# Patient Record
Sex: MALE | Race: WHITE | NOT HISPANIC OR LATINO
[De-identification: names, ages, dates, MRNs, and addresses within clinical notes are randomized per-mention and may not be internally consistent; named-entity substitution may affect disease eponyms.]

---

## 2024-10-10 ENCOUNTER — DASHBOARD ENCOUNTERS (OUTPATIENT)
Age: 59
End: 2024-10-10

## 2024-10-10 ENCOUNTER — LAB OUTSIDE AN ENCOUNTER (OUTPATIENT)
Dept: URBAN - METROPOLITAN AREA CLINIC 40 | Facility: CLINIC | Age: 59
End: 2024-10-10

## 2024-10-10 ENCOUNTER — OFFICE VISIT (OUTPATIENT)
Dept: URBAN - METROPOLITAN AREA CLINIC 40 | Facility: CLINIC | Age: 59
End: 2024-10-10
Payer: COMMERCIAL

## 2024-10-10 VITALS
TEMPERATURE: 98.1 F | SYSTOLIC BLOOD PRESSURE: 150 MMHG | DIASTOLIC BLOOD PRESSURE: 80 MMHG | HEART RATE: 84 BPM | WEIGHT: 174 LBS | HEIGHT: 71 IN | BODY MASS INDEX: 24.36 KG/M2

## 2024-10-10 DIAGNOSIS — Z09 ENCOUNTER FOR FOLLOW-UP: ICD-10-CM

## 2024-10-10 DIAGNOSIS — Z87.19 HISTORY OF PANCREATITIS: ICD-10-CM

## 2024-10-10 DIAGNOSIS — R19.7 ACUTE DIARRHEA: ICD-10-CM

## 2024-10-10 DIAGNOSIS — R10.11 RUQ ABDOMINAL PAIN: ICD-10-CM

## 2024-10-10 DIAGNOSIS — Z86.0100 PERSONAL HISTORY OF COLONIC POLYPS: ICD-10-CM

## 2024-10-10 PROCEDURE — 99203 OFFICE O/P NEW LOW 30 MIN: CPT | Performed by: PHYSICIAN ASSISTANT

## 2024-10-10 RX ORDER — DILTIAZEM HYDROCHLORIDE 120 MG/1
AS DIRECTED TABLET, FILM COATED ORAL
Status: ACTIVE | COMMUNITY

## 2024-10-10 RX ORDER — ROSUVASTATIN CALCIUM 5 MG/1
1 TABLET TABLET, COATED ORAL ONCE A DAY
Status: ACTIVE | COMMUNITY

## 2024-10-10 RX ORDER — LEVOTHYROXINE SODIUM 137 UG/1
1 TABLET IN THE MORNING ON AN EMPTY STOMACH TABLET ORAL ONCE A DAY
Status: ACTIVE | COMMUNITY

## 2024-10-10 RX ORDER — ESOMEPRAZOLE MAGNESIUM 20 MG/1
1 CAPSULE CAPSULE, DELAYED RELEASE PELLETS ORAL ONCE A DAY
Status: ACTIVE | COMMUNITY

## 2024-10-10 RX ORDER — SITAGLIPTIN 25 MG/1
AS DIRECTED TABLET, FILM COATED ORAL
Status: ACTIVE | COMMUNITY

## 2024-10-10 RX ORDER — LISINOPRIL 20 MG/1
1 TABLET TABLET ORAL ONCE A DAY
Status: ACTIVE | COMMUNITY

## 2024-10-10 NOTE — HPI-TODAY'S VISIT:
Mr. Sue is a 59-year-old white male who presents to the office today with recent onset of loose stools with right upper quadrant abdominal pain after eating a hamburger and French fries at [employer] in the last 1 week.  No nausea, vomiting, fever or chills reported.  Patient has a history of hypertension, thyroid disease, history of "hereditary pancreatitis" with several generations study by providers of [facility] greater than 20 years ago.  Patient has had no episode of pancreatitis in at least 20 years.  History of colon polyps, previously followed by Dr. Rico.  He is overdue for surveillance colonoscopy with benign polyps removed approximately 10 years ago.  He has some history of heartburn and takes esomeprazole low-dose 20 mg daily.  Denies alcohol use and has been chewing tobacco for many years.  No recent EGD.  Denies IV or recreational drug use.  History of A-fib reportedly.  Patient had labs drawn last week including amylase and lipase by primary care provider and states that the results were normal.

## 2024-10-23 ENCOUNTER — OFFICE VISIT (OUTPATIENT)
Dept: URBAN - METROPOLITAN AREA CLINIC 39 | Facility: CLINIC | Age: 59
End: 2024-10-23
Payer: COMMERCIAL

## 2024-10-23 DIAGNOSIS — R93.2 ABNORMAL ULTRASOUND OF LIVER: ICD-10-CM

## 2024-10-23 DIAGNOSIS — N28.1 RENAL CYST: ICD-10-CM

## 2024-10-23 PROCEDURE — 76705 ECHO EXAM OF ABDOMEN: CPT | Performed by: INTERNAL MEDICINE

## 2024-11-18 ENCOUNTER — OFFICE VISIT (OUTPATIENT)
Dept: URBAN - METROPOLITAN AREA SURGERY CENTER 30 | Facility: SURGERY CENTER | Age: 59
End: 2024-11-18

## 2024-12-03 ENCOUNTER — OFFICE VISIT (OUTPATIENT)
Dept: URBAN - METROPOLITAN AREA SURGERY CENTER 30 | Facility: SURGERY CENTER | Age: 59
End: 2024-12-03

## 2024-12-13 ENCOUNTER — OFFICE VISIT (OUTPATIENT)
Dept: URBAN - METROPOLITAN AREA TELEHEALTH 2 | Facility: TELEHEALTH | Age: 59
End: 2024-12-13

## 2024-12-13 RX ORDER — DILTIAZEM HYDROCHLORIDE 120 MG/1
AS DIRECTED TABLET, FILM COATED ORAL
Status: ACTIVE | COMMUNITY

## 2024-12-13 RX ORDER — LISINOPRIL 20 MG/1
1 TABLET TABLET ORAL ONCE A DAY
Status: ACTIVE | COMMUNITY

## 2024-12-13 RX ORDER — LEVOTHYROXINE SODIUM 137 UG/1
1 TABLET IN THE MORNING ON AN EMPTY STOMACH TABLET ORAL ONCE A DAY
Status: ACTIVE | COMMUNITY

## 2024-12-13 RX ORDER — ROSUVASTATIN CALCIUM 5 MG/1
1 TABLET TABLET, COATED ORAL ONCE A DAY
Status: ACTIVE | COMMUNITY

## 2024-12-13 RX ORDER — ESOMEPRAZOLE MAGNESIUM 20 MG/1
1 CAPSULE CAPSULE, DELAYED RELEASE PELLETS ORAL ONCE A DAY
Status: ACTIVE | COMMUNITY

## 2024-12-13 RX ORDER — SITAGLIPTIN 25 MG/1
AS DIRECTED TABLET, FILM COATED ORAL
Status: ACTIVE | COMMUNITY

## 2024-12-16 ENCOUNTER — CLAIMS CREATED FROM THE CLAIM WINDOW (OUTPATIENT)
Dept: URBAN - METROPOLITAN AREA SURGERY CENTER 30 | Facility: SURGERY CENTER | Age: 59
End: 2024-12-16
Payer: COMMERCIAL

## 2024-12-16 ENCOUNTER — CLAIMS CREATED FROM THE CLAIM WINDOW (OUTPATIENT)
Dept: URBAN - METROPOLITAN AREA CLINIC 4 | Facility: CLINIC | Age: 59
End: 2024-12-16
Payer: COMMERCIAL

## 2024-12-16 ENCOUNTER — OFFICE VISIT (OUTPATIENT)
Dept: URBAN - METROPOLITAN AREA SURGERY CENTER 30 | Facility: SURGERY CENTER | Age: 59
End: 2024-12-16

## 2024-12-16 DIAGNOSIS — K63.89 OTHER SPECIFIED DISEASES OF INTESTINE: ICD-10-CM

## 2024-12-16 DIAGNOSIS — Z86.0100 PERSONAL HISTORY OF COLONIC POLYPS: ICD-10-CM

## 2024-12-16 PROCEDURE — 00812 ANES LWR INTST SCR COLSC: CPT | Performed by: NURSE ANESTHETIST, CERTIFIED REGISTERED

## 2024-12-16 PROCEDURE — 88313 SPECIAL STAINS GROUP 2: CPT | Performed by: PATHOLOGY

## 2024-12-16 PROCEDURE — 88305 TISSUE EXAM BY PATHOLOGIST: CPT | Performed by: PATHOLOGY

## 2024-12-16 PROCEDURE — 88342 IMHCHEM/IMCYTCHM 1ST ANTB: CPT | Performed by: PATHOLOGY

## 2024-12-16 RX ORDER — ROSUVASTATIN CALCIUM 5 MG/1
1 TABLET TABLET, COATED ORAL ONCE A DAY
Status: ACTIVE | COMMUNITY

## 2024-12-16 RX ORDER — DILTIAZEM HYDROCHLORIDE 120 MG/1
AS DIRECTED TABLET, FILM COATED ORAL
Status: ACTIVE | COMMUNITY

## 2024-12-16 RX ORDER — LEVOTHYROXINE SODIUM 137 UG/1
1 TABLET IN THE MORNING ON AN EMPTY STOMACH TABLET ORAL ONCE A DAY
Status: ACTIVE | COMMUNITY

## 2024-12-16 RX ORDER — LISINOPRIL 20 MG/1
1 TABLET TABLET ORAL ONCE A DAY
Status: ACTIVE | COMMUNITY

## 2024-12-16 RX ORDER — ESOMEPRAZOLE MAGNESIUM 20 MG/1
1 CAPSULE CAPSULE, DELAYED RELEASE PELLETS ORAL ONCE A DAY
Status: ACTIVE | COMMUNITY

## 2024-12-16 RX ORDER — SITAGLIPTIN 25 MG/1
AS DIRECTED TABLET, FILM COATED ORAL
Status: ACTIVE | COMMUNITY

## 2025-01-17 ENCOUNTER — OFFICE VISIT (OUTPATIENT)
Dept: URBAN - METROPOLITAN AREA TELEHEALTH 2 | Facility: TELEHEALTH | Age: 60
End: 2025-01-17
Payer: COMMERCIAL

## 2025-01-17 ENCOUNTER — LAB OUTSIDE AN ENCOUNTER (OUTPATIENT)
Dept: URBAN - METROPOLITAN AREA TELEHEALTH 2 | Facility: TELEHEALTH | Age: 60
End: 2025-01-17

## 2025-01-17 DIAGNOSIS — K85.90 HEREDITARY PANCREATITIS: ICD-10-CM

## 2025-01-17 DIAGNOSIS — K21.9 GERD: ICD-10-CM

## 2025-01-17 DIAGNOSIS — Z86.0100 PERSONAL HISTORY OF COLONIC POLYPS: ICD-10-CM

## 2025-01-17 PROCEDURE — 99213 OFFICE O/P EST LOW 20 MIN: CPT | Performed by: PHYSICIAN ASSISTANT

## 2025-01-17 RX ORDER — SITAGLIPTIN 25 MG/1
AS DIRECTED TABLET, FILM COATED ORAL
Status: ACTIVE | COMMUNITY

## 2025-01-17 RX ORDER — LISINOPRIL 20 MG/1
1 TABLET TABLET ORAL ONCE A DAY
Status: ACTIVE | COMMUNITY

## 2025-01-17 RX ORDER — ROSUVASTATIN CALCIUM 5 MG/1
1 TABLET TABLET, COATED ORAL ONCE A DAY
Status: ACTIVE | COMMUNITY

## 2025-01-17 RX ORDER — ESOMEPRAZOLE MAGNESIUM 20 MG/1
1 CAPSULE CAPSULE, DELAYED RELEASE PELLETS ORAL ONCE A DAY
OUTPATIENT

## 2025-01-17 RX ORDER — DILTIAZEM HYDROCHLORIDE 120 MG/1
AS DIRECTED TABLET, FILM COATED ORAL
Status: ACTIVE | COMMUNITY

## 2025-01-17 RX ORDER — ESOMEPRAZOLE MAGNESIUM 20 MG/1
1 CAPSULE CAPSULE, DELAYED RELEASE PELLETS ORAL ONCE A DAY
Status: ACTIVE | COMMUNITY

## 2025-01-17 RX ORDER — LEVOTHYROXINE SODIUM 137 UG/1
1 TABLET IN THE MORNING ON AN EMPTY STOMACH TABLET ORAL ONCE A DAY
Status: ACTIVE | COMMUNITY

## 2025-01-17 NOTE — HPI-TODAY'S VISIT:
Mr. Sue is a 59-year-old white male who was last seen 10/10/24, with sudden onset of loose stools with right upper quadrant abdominal pain late last year. No nausea, vomiting, fever or chills reported.  Patient has a history of hypertension, thyroid disease, history of "hereditary pancreatitis" with several generations study by providers of [facility] greater than 20 years ago.  Patient has had no episode of pancreatitis in at least 20 years.  History of colon polyps, previously followed by Dr. Rico.  He has some history of heartburn and takes esomeprazole low-dose 20 mg daily.  Denies alcohol use and has been chewing tobacco for many years.  No recent EGD.  Denies IV or recreational drug use.  Normal colonoscopy w/ random biopsies 12/16/24. A 10-year screening recommended. Patient has done well since last visit and admits that his diarrhea spontaneously resolved and may have likely been transient or viral infection. He has been unable to wean PPI therapy, stating his symptoms recur reflux if he misses more than a couple doses.  No complaint of nausea, vomiting or dysphagia.

## 2025-04-14 ENCOUNTER — OFFICE VISIT (OUTPATIENT)
Dept: URBAN - METROPOLITAN AREA SURGERY CENTER 30 | Facility: SURGERY CENTER | Age: 60
End: 2025-04-14

## 2025-05-02 ENCOUNTER — OFFICE VISIT (OUTPATIENT)
Dept: URBAN - METROPOLITAN AREA TELEHEALTH 2 | Facility: TELEHEALTH | Age: 60
End: 2025-05-02